# Patient Record
Sex: FEMALE | Race: WHITE | Employment: FULL TIME | URBAN - METROPOLITAN AREA
[De-identification: names, ages, dates, MRNs, and addresses within clinical notes are randomized per-mention and may not be internally consistent; named-entity substitution may affect disease eponyms.]

---

## 2019-06-05 ENCOUNTER — ANESTHESIA - HEALTHEAST (OUTPATIENT)
Dept: SURGERY | Facility: AMBULATORY SURGERY CENTER | Age: 44
End: 2019-06-05

## 2019-06-05 ASSESSMENT — MIFFLIN-ST. JEOR
SCORE: 1474.82
SCORE: 1474.82

## 2019-06-06 ENCOUNTER — HOSPITAL ENCOUNTER (OUTPATIENT)
Dept: SURGERY | Facility: AMBULATORY SURGERY CENTER | Age: 44
Discharge: HOME | End: 2019-06-06
Attending: OBSTETRICS & GYNECOLOGY | Admitting: OBSTETRICS & GYNECOLOGY
Payer: COMMERCIAL

## 2019-06-06 ENCOUNTER — SURGERY - HEALTHEAST (OUTPATIENT)
Dept: SURGERY | Facility: AMBULATORY SURGERY CENTER | Age: 44
End: 2019-06-06

## 2019-06-06 DIAGNOSIS — N93.9 ABNORMAL UTERINE BLEEDING (AUB): ICD-10-CM

## 2019-06-06 DIAGNOSIS — G89.18 ACUTE POST-OPERATIVE PAIN: ICD-10-CM

## 2019-06-06 LAB
DIPSTICK EXPIRATION DATE - HISTORICAL: NORMAL
DIPSTICK LOT NUMBER - HISTORICAL: NORMAL
POC PREG URINE (HCG) HE - HISTORICAL: NEGATIVE
POC SPECIFIC GRAVITY, URINE - HISTORICAL: NORMAL
POCT KIT EXPIRATION DATE - HISTORICAL: NORMAL
POCT KIT LOT NUMBER HE - HISTORICAL: NORMAL
POCT NEGATIVE CONTROL HE - HISTORICAL: NORMAL
POCT POSITIVE CONTROL HE - HISTORICAL: NORMAL

## 2019-06-06 RX ORDER — IBUPROFEN 600 MG/1
600 TABLET, FILM COATED ORAL EVERY 6 HOURS PRN
Qty: 20 TABLET | Refills: 1 | Status: SHIPPED | OUTPATIENT
Start: 2019-06-06

## 2019-06-06 ASSESSMENT — MIFFLIN-ST. JEOR
SCORE: 1473.69
SCORE: 1473.69

## 2019-08-15 ENCOUNTER — HOSPITAL ENCOUNTER (OUTPATIENT)
Dept: ULTRASOUND IMAGING | Facility: CLINIC | Age: 44
Discharge: HOME | End: 2019-08-15
Attending: PHYSICIAN ASSISTANT | Admitting: PHYSICIAN ASSISTANT
Payer: COMMERCIAL

## 2019-08-15 DIAGNOSIS — M79.661 RIGHT CALF PAIN: ICD-10-CM

## 2019-08-15 PROCEDURE — 93971 EXTREMITY STUDY: CPT | Mod: RT

## 2021-05-29 NOTE — ANESTHESIA POSTPROCEDURE EVALUATION
Patient: Ellen Elmore  HYSTEROSCOPY, D&C, ENDOMETRIAL ABLATION - HYDROTHERMAL ABLATION  Anesthesia type: MAC    Patient location: Phase II Recovery  Last vitals:   Vitals Value Taken Time   /69 6/6/2019  2:00 PM   Temp 36.2  C (97.2  F) 6/6/2019  1:24 PM   Pulse 80 6/6/2019  2:04 PM   Resp 16 6/6/2019  1:24 PM   SpO2 100 % 6/6/2019  2:04 PM   Vitals shown include unvalidated device data.  Post vital signs: stable  Level of consciousness: awake and responds to simple questions  Post-anesthesia pain: pain controlled  Post-anesthesia nausea and vomiting: no  Pulmonary: unassisted, return to baseline  Cardiovascular: stable and blood pressure at baseline  Hydration: adequate  Anesthetic events: no    QCDR Measures:  ASA# 11 - [place]ka-op Cardiac Arrest: ASA11B - Patient did NOT experience unanticipated cardiac arrest  ASA# 12 - [place]ka-op Mortality Rate: ASA12B - Patient did NOT die  ASA# 13 - PACU Re-Intubation Rate: NA - No ETT / LMA used for case  ASA# 10 - Composite Anes Safety: ASA10A - No serious adverse event    Additional Notes:

## 2021-05-29 NOTE — H&P
I have performed an assessment and examined the patient, as necessary, to update the patient's current status that may have changed since the prior History and Physical.    Maureen Andre MD

## 2021-05-29 NOTE — OP NOTE
Date of surgery: 06/06/19     Pre operative Diagnosis:   1. Abnormal uterine bleeding     Post operative diagnosis:   1. Abnormal uterine bleeding    Procedure:   1) Diagnostic hysteroscopy  2) Dilation and curretage  3) Hydrothermal ablation with [employer] HTA system     Surgeon: Maureen Andre MD     Anesthesia: MAC     Fluids: LR 900mL   UOP: N/A  EBL: 5mL  Fluid deficit: 50mL for hysteroscopy portion. 0mL for ablation portion     Complications: None     Specimen to pathology:   Endometrial curettings     Findings:  --Small anteverted uterus  --Polypoid endometrium  --Bilateral tubal ostia were visualized and noted to appear normal, as was the fundus  --Ablated endometrial tissue      Indication:   45yo who presented as an outpatient with heavy menstrual bleeding. Pelvic ultrasound was normal.  An outpatient endometrial biopsy was benign. Medical and surgical options were reviewed with the patient.  She was consented for an operative hysteroscopy with endometrial ablation. All risks and benefits were reviewed with the patient and the patient consented to the procedure.      Procedure:   The patient was brought to the operating room where a time out was performed.  She was placed in the dorsal lithotomy position, prepped and draped in the normal sterile fashion.  A bivalve speculum was inserted into the vagina.  A tenaculum was placed on the anterior lip of the cervix. The cervix was dilated with Sara dilators to 7mm.  The hysteroscope was primed and introduced into the uterine cavity.  Bilateral ostia were visualized as was the fundus. Polypoid endometrium was noted. The hysteroscope was removed from the cavity.  A sharp curettage was performed/      The [employer] HTA sheeth was then inserted into the cervix.  A seal integrity check was completed.  The ablation system was then activated and the fluid achieved 90 degrees C for 9 minutes. A 90 second cooling cycle was then completed.  The cavity blanched white during  the procedure. Upon removal of the hysteroscopy, the endocervical canal transitioned from blanched white to pink tissue.      All instruments were removed from the cervix and vagina.  Excellent hemostasis was noted.   The patient tolerated the procedure well and was taken to the recovery room in stable condition.      Follow-up: Precautions and post-procedure instructions were given to the patient.  Appointment was made for a post-procedure check-up in approximately 2 weeks.

## 2021-05-29 NOTE — ANESTHESIA PREPROCEDURE EVALUATION
Anesthesia Evaluation      Patient summary reviewed   History of anesthetic complications     Airway   Mallampati: II  Neck ROM: full   Pulmonary - negative ROS and normal exam    breath sounds clear to auscultation  (-) sleep apnea, not a smoker                         Cardiovascular - negative ROS and normal exam  Exercise tolerance: > or = 4 METS  (-) hypertension, angina, hypercholesterolemia  Rhythm: regular  Rate: normal,         Neuro/Psych - negative ROS     Endo/Other - negative ROS   (-) no diabetes, hypothyroidism, hyperthyroidism, not pregnant     GI/Hepatic/Renal - negative ROS           Dental - normal exam                        Anesthesia Plan  Planned anesthetic: MAC    ASA 1   Induction: intravenous   Anesthetic plan and risks discussed with: patient  Anesthesia plan special considerations: antiemetics,   Post-op plan: routine recovery

## 2021-05-29 NOTE — ANESTHESIA CARE TRANSFER NOTE
Last vitals:   Vitals:    06/06/19 1324   BP: 110/62   Pulse: 86   Resp: 16   Temp: 36.2  C (97.2  F)   SpO2: 97%     Patient's level of consciousness is drowsy  Spontaneous respirations: yes  Maintains airway independently: yes  Dentition unchanged: yes  Oropharynx: oropharynx clear of all foreign objects    QCDR Measures:  ASA# 20 - Surgical Safety Checklist: WHO surgical safety checklist completed prior to induction    PQRS# 430 - Adult PONV Prevention: 4558F - Pt received => 2 anti-emetic agents (different classes) preop & intraop  ASA# 8 - Peds PONV Prevention: NA - Not pediatric patient, not GA or 2 or more risk factors NOT present  PQRS# 424 - Z[place]yka-op Temp Management: 4559F - At least one body temp DOCUMENTED => 35.5C or 95.9F within required timeframe  PQRS# 426 - PACU Transfer Protocol: - Transfer of care checklist used  ASA# 14 - Acute Post-op Pain: ASA14B - Patient did NOT experience pain >= 7 out of 10

## 2021-05-31 ENCOUNTER — RECORDS - HEALTHEAST (OUTPATIENT)
Dept: ADMINISTRATIVE | Facility: CLINIC | Age: 46
End: 2021-05-31

## 2021-06-03 VITALS
HEIGHT: 66 IN | HEIGHT: 66 IN | BODY MASS INDEX: 28.77 KG/M2 | WEIGHT: 179 LBS | WEIGHT: 179 LBS | BODY MASS INDEX: 28.77 KG/M2

## 2021-06-09 ENCOUNTER — RECORDS - HEALTHEAST (OUTPATIENT)
Dept: ADMINISTRATIVE | Facility: CLINIC | Age: 46
End: 2021-06-09